# Patient Record
Sex: FEMALE | URBAN - METROPOLITAN AREA
[De-identification: names, ages, dates, MRNs, and addresses within clinical notes are randomized per-mention and may not be internally consistent; named-entity substitution may affect disease eponyms.]

---

## 2023-02-07 ENCOUNTER — APPOINTMENT (RX ONLY)
Dept: URBAN - METROPOLITAN AREA TELEMEDICINE 2 | Facility: TELEMEDICINE | Age: 48
Setting detail: DERMATOLOGY
End: 2023-02-07

## 2023-02-07 DIAGNOSIS — Z41.9 ENCOUNTER FOR PROCEDURE FOR PURPOSES OTHER THAN REMEDYING HEALTH STATE, UNSPECIFIED: ICD-10-CM

## 2023-02-07 PROCEDURE — ? COSMETIC CONSULTATION - MICRO-NEEDLING

## 2023-02-07 ASSESSMENT — LOCATION SIMPLE DESCRIPTION DERM
LOCATION SIMPLE: LEFT CHEEK
LOCATION SIMPLE: RIGHT CHEEK

## 2023-02-07 ASSESSMENT — LOCATION ZONE DERM
LOCATION ZONE: FACE
LOCATION ZONE: FACE

## 2023-02-07 ASSESSMENT — LOCATION DETAILED DESCRIPTION DERM
LOCATION DETAILED: LEFT CENTRAL MALAR CHEEK
LOCATION DETAILED: RIGHT CENTRAL MALAR CHEEK

## 2023-02-07 NOTE — HPI: COSMETIC CONSULTATION
When Outside In The Sun, Do You...: mostly burns, rarely tans
Additional History: * concerned with puffiness under the eyes, fine lines, volume loss, primarily wanted professional opinion \\n* chest wrinkles from side sleeping\\n*. Not interested in Botox due to how friends look \\n* under eye puffiness is not transient, and has always had them. It has worsened in last 2 years. tried mall eye treatment to help with puffiness didn’t t work well or for long period of time.\\n*AM:clean and clear salicylic toner, ponds, moisturizer with spa possibly\\n*PM:same as am\\n* potential allergy to benzoyl topically, breaks out\\n* redness flares and reduces, increases with heat, alcohol, working out\\n*advised she is diagnosed with rosacea by primary, not treating\\n* pt. Unaware of BP med, takes a daily B12 supplement